# Patient Record
Sex: MALE | Race: WHITE | HISPANIC OR LATINO | URBAN - METROPOLITAN AREA
[De-identification: names, ages, dates, MRNs, and addresses within clinical notes are randomized per-mention and may not be internally consistent; named-entity substitution may affect disease eponyms.]

---

## 2020-12-30 ENCOUNTER — EMERGENCY (EMERGENCY)
Facility: HOSPITAL | Age: 25
LOS: 1 days | Discharge: HOME | End: 2020-12-30
Attending: EMERGENCY MEDICINE
Payer: COMMERCIAL

## 2020-12-30 VITALS
DIASTOLIC BLOOD PRESSURE: 67 MMHG | HEIGHT: 69 IN | RESPIRATION RATE: 18 BRPM | HEART RATE: 79 BPM | SYSTOLIC BLOOD PRESSURE: 96 MMHG | TEMPERATURE: 98 F | WEIGHT: 156.97 LBS | OXYGEN SATURATION: 98 %

## 2020-12-30 LAB
ALBUMIN SERPL ELPH-MCNC: 5.3 G/DL — HIGH (ref 3.3–5.2)
ALP SERPL-CCNC: 53 U/L — SIGNIFICANT CHANGE UP (ref 40–120)
ALT FLD-CCNC: 18 U/L — SIGNIFICANT CHANGE UP
AMPHET UR-MCNC: NEGATIVE — SIGNIFICANT CHANGE UP
ANION GAP SERPL CALC-SCNC: 18 MMOL/L — HIGH (ref 5–17)
APPEARANCE UR: CLEAR — SIGNIFICANT CHANGE UP
AST SERPL-CCNC: 45 U/L — HIGH
BARBITURATES UR SCN-MCNC: NEGATIVE — SIGNIFICANT CHANGE UP
BASOPHILS # BLD AUTO: 0.05 K/UL — SIGNIFICANT CHANGE UP (ref 0–0.2)
BASOPHILS NFR BLD AUTO: 0.3 % — SIGNIFICANT CHANGE UP (ref 0–2)
BENZODIAZ UR-MCNC: POSITIVE
BILIRUB SERPL-MCNC: 0.6 MG/DL — SIGNIFICANT CHANGE UP (ref 0.4–2)
BILIRUB UR-MCNC: NEGATIVE — SIGNIFICANT CHANGE UP
BUN SERPL-MCNC: 13 MG/DL — SIGNIFICANT CHANGE UP (ref 8–20)
CALCIUM SERPL-MCNC: 9.6 MG/DL — SIGNIFICANT CHANGE UP (ref 8.6–10.2)
CHLORIDE SERPL-SCNC: 101 MMOL/L — SIGNIFICANT CHANGE UP (ref 98–107)
CO2 SERPL-SCNC: 23 MMOL/L — SIGNIFICANT CHANGE UP (ref 22–29)
COCAINE METAB.OTHER UR-MCNC: NEGATIVE — SIGNIFICANT CHANGE UP
COLOR SPEC: YELLOW — SIGNIFICANT CHANGE UP
CREAT SERPL-MCNC: 0.75 MG/DL — SIGNIFICANT CHANGE UP (ref 0.5–1.3)
DIFF PNL FLD: ABNORMAL
EOSINOPHIL # BLD AUTO: 0.01 K/UL — SIGNIFICANT CHANGE UP (ref 0–0.5)
EOSINOPHIL NFR BLD AUTO: 0.1 % — SIGNIFICANT CHANGE UP (ref 0–6)
EPI CELLS # UR: SIGNIFICANT CHANGE UP
GLUCOSE SERPL-MCNC: 82 MG/DL — SIGNIFICANT CHANGE UP (ref 70–99)
GLUCOSE UR QL: NEGATIVE MG/DL — SIGNIFICANT CHANGE UP
HCT VFR BLD CALC: 49.4 % — SIGNIFICANT CHANGE UP (ref 39–50)
HGB BLD-MCNC: 16.6 G/DL — SIGNIFICANT CHANGE UP (ref 13–17)
IMM GRANULOCYTES NFR BLD AUTO: 0.8 % — SIGNIFICANT CHANGE UP (ref 0–1.5)
KETONES UR-MCNC: ABNORMAL
LEUKOCYTE ESTERASE UR-ACNC: NEGATIVE — SIGNIFICANT CHANGE UP
LYMPHOCYTES # BLD AUTO: 1.33 K/UL — SIGNIFICANT CHANGE UP (ref 1–3.3)
LYMPHOCYTES # BLD AUTO: 6.9 % — LOW (ref 13–44)
MAGNESIUM SERPL-MCNC: 2.4 MG/DL — SIGNIFICANT CHANGE UP (ref 1.6–2.6)
MCHC RBC-ENTMCNC: 31.4 PG — SIGNIFICANT CHANGE UP (ref 27–34)
MCHC RBC-ENTMCNC: 33.6 GM/DL — SIGNIFICANT CHANGE UP (ref 32–36)
MCV RBC AUTO: 93.4 FL — SIGNIFICANT CHANGE UP (ref 80–100)
METHADONE UR-MCNC: NEGATIVE — SIGNIFICANT CHANGE UP
MONOCYTES # BLD AUTO: 1.05 K/UL — HIGH (ref 0–0.9)
MONOCYTES NFR BLD AUTO: 5.4 % — SIGNIFICANT CHANGE UP (ref 2–14)
NEUTROPHILS # BLD AUTO: 16.68 K/UL — HIGH (ref 1.8–7.4)
NEUTROPHILS NFR BLD AUTO: 86.5 % — HIGH (ref 43–77)
NITRITE UR-MCNC: NEGATIVE — SIGNIFICANT CHANGE UP
OPIATES UR-MCNC: NEGATIVE — SIGNIFICANT CHANGE UP
PCP SPEC-MCNC: SIGNIFICANT CHANGE UP
PCP UR-MCNC: NEGATIVE — SIGNIFICANT CHANGE UP
PH UR: 5 — SIGNIFICANT CHANGE UP (ref 5–8)
PLATELET # BLD AUTO: 268 K/UL — SIGNIFICANT CHANGE UP (ref 150–400)
POTASSIUM SERPL-MCNC: 4.7 MMOL/L — SIGNIFICANT CHANGE UP (ref 3.5–5.3)
POTASSIUM SERPL-SCNC: 4.7 MMOL/L — SIGNIFICANT CHANGE UP (ref 3.5–5.3)
PROT SERPL-MCNC: 8.7 G/DL — SIGNIFICANT CHANGE UP (ref 6.6–8.7)
PROT UR-MCNC: 100 MG/DL
RBC # BLD: 5.29 M/UL — SIGNIFICANT CHANGE UP (ref 4.2–5.8)
RBC # FLD: 12.1 % — SIGNIFICANT CHANGE UP (ref 10.3–14.5)
RBC CASTS # UR COMP ASSIST: ABNORMAL /HPF (ref 0–4)
SODIUM SERPL-SCNC: 141 MMOL/L — SIGNIFICANT CHANGE UP (ref 135–145)
SP GR SPEC: 1.03 — HIGH (ref 1.01–1.02)
THC UR QL: POSITIVE
UROBILINOGEN FLD QL: NEGATIVE MG/DL — SIGNIFICANT CHANGE UP
WBC # BLD: 19.28 K/UL — HIGH (ref 3.8–10.5)
WBC # FLD AUTO: 19.28 K/UL — HIGH (ref 3.8–10.5)
WBC UR QL: NEGATIVE — SIGNIFICANT CHANGE UP

## 2020-12-30 PROCEDURE — 85025 COMPLETE CBC W/AUTO DIFF WBC: CPT

## 2020-12-30 PROCEDURE — U0003: CPT

## 2020-12-30 PROCEDURE — 80053 COMPREHEN METABOLIC PANEL: CPT

## 2020-12-30 PROCEDURE — 70553 MRI BRAIN STEM W/O & W/DYE: CPT

## 2020-12-30 PROCEDURE — 93005 ELECTROCARDIOGRAM TRACING: CPT

## 2020-12-30 PROCEDURE — 81001 URINALYSIS AUTO W/SCOPE: CPT

## 2020-12-30 PROCEDURE — 71046 X-RAY EXAM CHEST 2 VIEWS: CPT

## 2020-12-30 PROCEDURE — 70450 CT HEAD/BRAIN W/O DYE: CPT | Mod: 26

## 2020-12-30 PROCEDURE — 83735 ASSAY OF MAGNESIUM: CPT

## 2020-12-30 PROCEDURE — 99284 EMERGENCY DEPT VISIT MOD MDM: CPT

## 2020-12-30 PROCEDURE — 93010 ELECTROCARDIOGRAM REPORT: CPT

## 2020-12-30 PROCEDURE — 80307 DRUG TEST PRSMV CHEM ANLYZR: CPT

## 2020-12-30 PROCEDURE — 70450 CT HEAD/BRAIN W/O DYE: CPT

## 2020-12-30 PROCEDURE — G0378: CPT

## 2020-12-30 PROCEDURE — 83605 ASSAY OF LACTIC ACID: CPT

## 2020-12-30 PROCEDURE — 99218: CPT

## 2020-12-30 PROCEDURE — 71046 X-RAY EXAM CHEST 2 VIEWS: CPT | Mod: 26

## 2020-12-30 PROCEDURE — 95819 EEG AWAKE AND ASLEEP: CPT

## 2020-12-30 PROCEDURE — 36415 COLL VENOUS BLD VENIPUNCTURE: CPT

## 2020-12-30 RX ORDER — IBUPROFEN 200 MG
600 TABLET ORAL ONCE
Refills: 0 | Status: COMPLETED | OUTPATIENT
Start: 2020-12-30 | End: 2020-12-30

## 2020-12-30 RX ADMIN — Medication 600 MILLIGRAM(S): at 23:17

## 2020-12-30 NOTE — ED CDU PROVIDER INITIAL DAY NOTE - OBJECTIVE STATEMENT
25y male pt with no pmhx presents to ED c/o seizures. Pt states he felt nauseous and threw up. Pt states he was working, when he felt weak and had a piece of chocolate which made him feel dizzy, and led him to throw up. Pt states he felt cold and shaky after. Pt states he is still nauseous but otherwise feels fine. As per brother however, pt brother picked him up from work and states pt began to seize, eyes rolled back and began foaming at the mouth for about 5/6 minutes. Upon waking up he began to throw up. Pt brother states pt was confused when he woke up and does not remember any part of the episode. Pt admits to smoking weed every day and currently feels a little weak and dizzy.

## 2020-12-30 NOTE — ED CDU PROVIDER INITIAL DAY NOTE - ATTENDING CONTRIBUTION TO CARE
young adult male, likely new onset seizure- obs for mri and eeg; currently well appearing back to normal, no active symptoms

## 2020-12-30 NOTE — ED STATDOCS - PROGRESS NOTE DETAILS
TODD PHILLIPS: Spoke with Dr. Mckinley from Epilepsy service recommends lactate, magnesium and observing for 24 hours. Also recommends EEG and MRI w/wo contrast. Pt info taken, states provider from epilepsy group will see pt tomorrow.

## 2020-12-30 NOTE — ED CDU PROVIDER INITIAL DAY NOTE - PHYSICAL EXAMINATION
· CONSTITUTIONAL: well appearing and in no apparent distress.  · EYES: clear bilaterally.  Pupils equal, round, and reactive to light.  · ENMT: Nasal mucosa clear.  Mouth with normal mucosa  Throat has no vesicles, no oropharyngeal exudates and uvula is midline.  · CARDIAC: normal rate, regular rhythm, and no murmur.  · RESPIRATORY: breath sounds clear and equal bilaterally.  · GASTROINTESTINAL: abdomen soft, non-tender, and non-distended. Bowel sounds present.  · MUSCULOSKELETAL: range of motion is not limited and there is no muscle tenderness.  · NEUROLOGICAL: sensation is normal and strength is normal.

## 2020-12-30 NOTE — ED CDU PROVIDER INITIAL DAY NOTE - DETAILS
25 year old male with presents to ED after suspected seizure. Pt placed in obs for MRI w/wo contrast, EEG and neuro consult.

## 2020-12-30 NOTE — CHART NOTE - NSCHARTNOTEFT_GEN_A_CORE
Provider was contacted by treating PA in the ED. PA reported that patient had suffered an episode of loss of consciousness with possible foaming at the mouth and generalized tremulousness lasting seconds to minutes. Following the episode the patient was seen to be confused. Episode was concerning for seizure like spell. Patient had no prior history of similar spells, family history of seizure, history of CNS infection or head trauma, history of prematurity, or history of febrile seizures in youth. Patient had no history of recent illness or signs of infection and was reportedly back to functional baseline. Patient was not on any AED medication and did not follow with a neurologist. Vitals were reported as stable. Neurological examination was reported as within normal limits. Labs including Na, and Ca were seen to be within normal limits. Mg was not ordered, lactic acid was not ordered. PA requested additional recommendations. Previous plan was for patient was to be kept for 24 hour observation, recommended during that time to obtain MRI brain w/ w/o contrast epilepsy protocol as well as routine EEG. Recommended addition of Mg and lactic acid to lab work already done. Could also consider urine drugs of abuse screen. Recommended follow up with neurology in the outpatient setting. In light of possible recent seizure episode recommended patient not drive, or operate heavy machinery, avoid working in precarious situations such as on roofs or on construction sites, and avoid bathing in baths or swimming pools unsupervised. As this was the patient's first episode of this type with no prior episode concerning for seizure did not recommend initiation of AED medication at this time.    Sanjay Murphy MD PGY-5  Epilepsy Fellow    This is a preliminary report     Reading Room: [phone]  On Call Service After Hours: [phone]

## 2020-12-30 NOTE — ED ADULT TRIAGE NOTE - NS ED TRIAGE AVPU SCALE
Alert-The patient is alert, awake and responds to voice. The patient is oriented to time, place, and person. The triage nurse is able to obtain subjective information. Self

## 2020-12-30 NOTE — ED ADULT TRIAGE NOTE - CHIEF COMPLAINT QUOTE
patient had "throw up" 2 hours ago, brother Emmett [phone] stated he had a seizure, patient denies seizure or hx of seizure. patient c/o nausea and dizziness. patient stated that he smokes Marijuana. patient denies drinking alcohol.

## 2020-12-30 NOTE — ED STATDOCS - OBJECTIVE STATEMENT
25y male pt with no pmhx presents to ED c/o seizures. Pt states he felt nauseous and threw up. Pt states he was working, when he felt weak and had a piece of chocolate which made him feel dizzy, and led him to throw up. Pt states he felt cold and shaky after. Pt states he is still nauseous but otherwise feels fine. As per brother however, pt brother picked him up from work and states pt began to seize, eyes rolled back and began foaming at the mouth for about 5/6 minutes. Upon waking up he began to throw up. Pt brother states pt was confused when he woke up and does not remember any part of the episode. Pt admits to smoking weed every day and currently feels a little weak and dizzy.     pt denies LOC, tobacco use, alcohol use, other illicit drugs

## 2020-12-30 NOTE — ED STATDOCS - ATTENDING CONTRIBUTION TO CARE
I, Antolin Lopes, performed the initial face to face bedside interview with this patient regarding history of present illness, review of symptoms and relevant past medical, social and family history.  I completed an independent physical examination.  I was the initial provider who evaluated this patient. I have signed out the follow up of any pending tests (i.e. labs, radiological studies) to the ACP.  I have communicated the patient’s plan of care and disposition with the ACP.

## 2020-12-31 VITALS
OXYGEN SATURATION: 100 % | DIASTOLIC BLOOD PRESSURE: 72 MMHG | SYSTOLIC BLOOD PRESSURE: 112 MMHG | RESPIRATION RATE: 18 BRPM | HEART RATE: 62 BPM | TEMPERATURE: 99 F

## 2020-12-31 DIAGNOSIS — R56.9 UNSPECIFIED CONVULSIONS: ICD-10-CM

## 2020-12-31 LAB — SARS-COV-2 RNA SPEC QL NAA+PROBE: SIGNIFICANT CHANGE UP

## 2020-12-31 PROCEDURE — 95819 EEG AWAKE AND ASLEEP: CPT | Mod: 26

## 2020-12-31 PROCEDURE — 99217: CPT

## 2020-12-31 PROCEDURE — 99252 IP/OBS CONSLTJ NEW/EST SF 35: CPT

## 2020-12-31 PROCEDURE — 70553 MRI BRAIN STEM W/O & W/DYE: CPT | Mod: 26

## 2020-12-31 RX ADMIN — Medication 600 MILLIGRAM(S): at 02:32

## 2020-12-31 NOTE — EEG REPORT - NS EEG TEXT BOX
EAMON SANCHEZ [id number])M  Admitting MD:  Not Available Doctor    Study Date: 12-31-20    ROUTINE EEG    Technical Information:			  		  Placement and Labeling of Electrodes:  The EEG was performed utilizing 20 channels referential EEG connections (coronal over temporal over parasagittal montage) using all standard 10-20 electrode placements with EKG.  Recording was at a sampling rate of 256 samples per second per channel.  Time synchronized digital video recording was done simultaneously with EEG recording.  A low light infrared camera was used for low light recording.  Omid and seizure detection algorithms were utilized.    CSA Technical Component:  Quantitative EEG analysis using a separate Compressed Spectral Array (CSA) software package was conducted in real-time and run at bedside after set up by the technician, digitally displaying the power of electrographic frequencies included in the 1-30Hz band using a graded color map.  This data was reviewed and interpreted independently, and is reported in a separate section below.    --------------------------------------------------------------------------------------------------  History:  CC/ HPI Patient is a 25y old  Male who presents with a chief complaint of     --------------------------------------------------------------------------------------------------  Study Interpretation:    [[[Abbreviation Key:  PDR=alpha rhythm/posterior dominant rhythm. A-P=anterior posterior gradient.  Amplitude: ‘very low’:<20; ‘low’:20-50; ‘medium’:; ‘high’:>200uV.  Persistence for periodic/rhythmic patterns (% of epoch) ‘rare’:<1%; ‘occasional’:1-10%; ‘frequent’:10-50%; ‘abundant’:50-90%; ‘continuous’:>90%.  Persistence for sporadic discharges: ‘rare’:<1/hr; ‘occasional’:1/min-1/hr; ‘frequent’:>1/min; ‘abundant’:>1/10 sec.  GRDA=generalized rhythmic delta activity, LRDA=lateralized rhythmic delta activity, TIRDA=temporal intermittent rhythmic delta activity, FIRDA=frontal intermittent rhythmic activity. LPD=PLED=lateralized periodic discharges, GPD=generalized periodic discharges, BiPDs=BiPLEDs=bilateral independent periodic epileptiform discharges, SIRPID=stimulus induced rhythmic, periodic, or ictal appearing discharges.  Modifiers: +F=with fast component, +S=with spike component, +R=with rhythmic component.  S-B=burst suppression pattern.  Max=maximal. N1-drowsy, N2-stage II sleep, N3-slow wave sleep.  HV=hyperventilation, PS=photic stimulation]]]    FINDINGS:  The background was continuous, spontaneously variable and reactive.  During wakefulness, the posteriorly dominant rhythm consisted of symmetric, well modulated 10Hz activity, with an amplitude to 40 uV, that attenuated to eye opening.  Low amplitude central beta was noted in wakefulness.    Background Slowing:  Generalized slowing: none was present.  Focal slowing: none was present.    Sleep Background:  Stage II sleep transients were not recorded.    Epileptiform Activity:   No epileptiform discharges were present.    Events:  No clinical events were recorded.  No seizures were recorded.    Activation Procedures:   -Hyperventilation was not performed.    -Photic stimulation was performed and did not elicit any abnormalities.      Artifacts:  Intermittent myogenic and movement artifacts were noted.    ECG:  The heart rate on single channel ECG at baseline was predominantly near BPM = 60-66  -----------------------------------------------------------------------------------------------------    EEG Classification / Summary:  Normal EEG study, awake / drowsy / asleep    -  -----------------------------------------------------------------------------------------------------    Clinical Impression:  There were no epileptiform abnormalities recorded.      -------------------------------------------------------------------------------------------------------  [facility] EEG Reading Room Ph#: [phone]  Epilepsy Answering Service after 5PM and before 8:30AM: Ph#: [phone]    Darius Nair M.D.   of Neurology, [facility] Epilepsy [place]

## 2020-12-31 NOTE — CONSULT NOTE ADULT - ASSESSMENT
26 yo man with first time seizure with normal MRI brain and routine EEG. We discussed this could be due to recreational benzo use, possible withdrawal vs benzo being laced with something, however, patient states he has not had benzos in 3 months although urine screen + for benzos. We discussed that in a patient with a first time seizure with no risk factors, the risk of this recurring is 20-30%. We discussed antiepileptic medication, however, patient would like to hold off at this time which is a reasonable option. If seizure recurs, patient will need to be started on an antiepileptic and he demonstrated understanding.

## 2020-12-31 NOTE — ED ADULT NURSE REASSESSMENT NOTE - COMFORT CARE
ambulated to bathroom/meal provided/plan of care explained/po fluids offered/repositioned/wait time explained

## 2020-12-31 NOTE — ED ADULT NURSE NOTE - OBJECTIVE STATEMENT
Pt in no apparent distress at this time. Airway patent, breathing spontaneous and nonlabored. Pt A&Ox3 resting in stretcher. Pt c/o seizure like activity at home and nausea earlier today. Reports he did not eat and felt weak today. Pt brother reports eyes rolled in head. No PMH of seizures. Pt endorses marijuana use daily.

## 2020-12-31 NOTE — ED ADULT NURSE REASSESSMENT NOTE - NS ED NURSE REASSESS COMMENT FT1
Assumed care of pt from previous RN. Pt currently a&ox3, in no acute distress, offers no new complaints at this time. Oriented to room and updated pt on plan of care. Awaiting EEG and MRI.
EEG at the bedside.
Patient requesting to remove PIV, does not want to wait for EEG results. Patient requesting to go home. Patient educated about importance to staying in hospital pending EEG results, Errol Salinas notified, will come and speak with patient. PIV removed by RN as per patient request.
VSS. Patient remains asymptomatic. Pending EEG testing. No neuro deficits noted. Will continue to monitor.
Assumed care of the patient at 0730. Verbal report received from [facility]. Patient A&Ox4. No s/s of acute distress. Denies CP/SOB or dizziness. Neuro intact. No seizure activity noted. Seizure precautions maintained. PIV patent. Ambulatory with steady gait. Patient pending EEG and MRI testing. Patient in understanding of plan of care. Patient with no further questions for the RN. Resting in comfort. Call bell within reach and encouraged to use when assistance needed. Will continue to monitor.

## 2020-12-31 NOTE — ED CDU PROVIDER DISPOSITION NOTE - CARE PROVIDER_API CALL
Karina Kate (DO)  EEGEpilepsy; Neurology  [address] [place] [address]  Phone: [phone]  Fax: [phone]  Follow Up Time:

## 2020-12-31 NOTE — CONSULT NOTE ADULT - PROBLEM SELECTOR RECOMMENDATION 9
no AED at this time  patient advised to stop recreational benzo use  He was advised to return to ED should he have another seizure  He was made aware not to drive until seizure free for a year  He was advised not to operate heavy machinery or engage in activities in which a seizure will cause harm to himself or others  He was advised to follow up with me as an outpatient [phone]  R/o underlying infection  Patient can be discharged from a neurologic perspective.

## 2020-12-31 NOTE — CONSULT NOTE ADULT - SUBJECTIVE AND OBJECTIVE BOX
Epilepsy Consult Note  [facility] at [address] [address]  [phone]    HPI:  26 yo man with no significant pmhx presents after likely seizure yesterday. He states he felt nauseous after eating a piece of chocolate and then blacked out. The next thing  PMHx:  PSHx:  Meds:  Allergies:  FamHx:  SocHx:    Physical Exam   Neurology/Epilepsy Consult Note  [facility] at [place] [address] [place] [address]  [phone]    HPI:  24 yo man with no significant pmhx presents after likely seizure yesterday. He states he felt nauseous after eating a piece of chocolate and then blacked out. The next thing he remembers is being in the hospital. I spoke to his brother over the phone who states that yesterday patient was sitting in a chair and then his eyes rolled in the back of his head. He was shaking for 5-6 minutes and was foaming at the mouth. He then was not responding, so brother called EMS. Patient denies having prior seizures. He denies learning difficulties growing up, headache, recent fever, prior infection in the brain, prior head trauma or a personal or family history of prior seizures. He admits to taking xanax 2-3mg three times a month, but he states the last time he took xanax was three months ago. His urine drug screen is positive for benzodiazepine and THC. He had an MRI brain w/wo contrast which was unremarkable. He had a routine EEG today which was normal    PMHx:  none    PSHx:  none    Meds:  xanax off the street on rare occasions as per patient    Allergies:  NKA    FamHx:  no pertinent family history    SocHx:  Denies smoking and alcohol use  Admits to occasional recreational benzo use    Physical Exam  Vital Signs Last 24 Hrs  T(C): 37.1 (31 Dec 2020 14:47), Max: 37.5 (30 Dec 2020 22:53)  T(F): 98.8 (31 Dec 2020 14:47), Max: 99.5 (30 Dec 2020 22:53)  HR: 62 (31 Dec 2020 14:47) (62 - 79)  BP: 112/72 (31 Dec 2020 14:47) (96/67 - 114/73)  BP(mean): --  RR: 18 (31 Dec 2020 14:47) (16 - 19)  SpO2: 100% (31 Dec 2020 14:47) (98% - 100%)  Mental Status: AAO x 3, no dysarthria, no aphasia  CN: PERRL, EOMI, VFF, V1-V3 sensation intact, no facial asymmetry  Motor:  5/5 x 4 extremities  Sensory: intact to light touch throughout  Coordination: no dysmetria on FTN  Gait: deferred      LABS:                          16.6   19.28 )-----------( 268      ( 30 Dec 2020 20:51 )             49.4     12-30    141  |  101  |  13.0  ----------------------------<  82  4.7   |  23.0  |  0.75    Ca    9.6      30 Dec 2020 20:51  Mg     2.4     12-30    TPro  8.7  /  Alb  5.3<H>  /  TBili  0.6  /  DBili  x   /  AST  45<H>  /  ALT  18  /  AlkPhos  53  12-30    Routine EEG reviewed - no seizures or epileptiform abnormalities seen, official read pending

## 2020-12-31 NOTE — ED CDU PROVIDER DISPOSITION NOTE - PATIENT PORTAL LINK FT
You can access the FollowMyHealth Patient Portal offered by [facility] by registering at the following website: [email or web address]. By joining [facility]’s FollowMyHealth portal, you will also be able to view your health information using other applications (apps) compatible with our system.

## 2020-12-31 NOTE — ED ADULT NURSE NOTE - CHIEF COMPLAINT QUOTE
patient had "throw up" 2 hours ago, brother Emmett [phone] stated he had a seizure, patient denies seizure or hx of seizure. patient c/o nausea and dizziness. patient stated that he smokes Marijuana. patient denies drinking alcohol.
